# Patient Record
Sex: FEMALE | Race: BLACK OR AFRICAN AMERICAN | NOT HISPANIC OR LATINO | URBAN - METROPOLITAN AREA
[De-identification: names, ages, dates, MRNs, and addresses within clinical notes are randomized per-mention and may not be internally consistent; named-entity substitution may affect disease eponyms.]

---

## 2017-05-24 ENCOUNTER — EMERGENCY (EMERGENCY)
Facility: HOSPITAL | Age: 32
LOS: 1 days | Discharge: LEFT BEFORE DISCHARGE | End: 2017-05-24

## 2017-05-24 VITALS — DIASTOLIC BLOOD PRESSURE: 90 MMHG | HEART RATE: 80 BPM | TEMPERATURE: 98 F | SYSTOLIC BLOOD PRESSURE: 140 MMHG

## 2017-05-24 VITALS — HEIGHT: 67 IN | WEIGHT: 123.02 LBS

## 2017-05-24 DIAGNOSIS — R21 RASH AND OTHER NONSPECIFIC SKIN ERUPTION: ICD-10-CM

## 2017-05-24 DIAGNOSIS — Z98.89 OTHER SPECIFIED POSTPROCEDURAL STATES: Chronic | ICD-10-CM

## 2017-05-25 ENCOUNTER — EMERGENCY (EMERGENCY)
Facility: HOSPITAL | Age: 32
LOS: 1 days | Discharge: LEFT BEFORE DISCHARGE | End: 2017-05-25

## 2017-05-25 VITALS
SYSTOLIC BLOOD PRESSURE: 140 MMHG | HEART RATE: 62 BPM | TEMPERATURE: 98 F | WEIGHT: 123.02 LBS | RESPIRATION RATE: 20 BRPM | DIASTOLIC BLOOD PRESSURE: 73 MMHG | HEIGHT: 67 IN | OXYGEN SATURATION: 99 %

## 2017-05-25 DIAGNOSIS — R21 RASH AND OTHER NONSPECIFIC SKIN ERUPTION: ICD-10-CM

## 2017-05-25 DIAGNOSIS — Z98.89 OTHER SPECIFIED POSTPROCEDURAL STATES: Chronic | ICD-10-CM

## 2017-05-26 ENCOUNTER — EMERGENCY (EMERGENCY)
Facility: HOSPITAL | Age: 32
LOS: 1 days | Discharge: HOME | End: 2017-05-26
Attending: EMERGENCY MEDICINE | Admitting: EMERGENCY MEDICINE
Payer: COMMERCIAL

## 2017-05-26 VITALS
OXYGEN SATURATION: 99 % | HEIGHT: 67 IN | HEART RATE: 68 BPM | RESPIRATION RATE: 18 BRPM | WEIGHT: 123.02 LBS | TEMPERATURE: 98 F | SYSTOLIC BLOOD PRESSURE: 120 MMHG | DIASTOLIC BLOOD PRESSURE: 68 MMHG

## 2017-05-26 DIAGNOSIS — Z98.89 OTHER SPECIFIED POSTPROCEDURAL STATES: Chronic | ICD-10-CM

## 2017-05-26 PROCEDURE — 99283 EMERGENCY DEPT VISIT LOW MDM: CPT

## 2017-05-26 RX ORDER — IBUPROFEN 200 MG
1 TABLET ORAL
Qty: 15 | Refills: 0 | OUTPATIENT
Start: 2017-05-26 | End: 2017-05-31

## 2017-05-26 RX ORDER — VALACYCLOVIR 500 MG/1
1 TABLET, FILM COATED ORAL
Qty: 21 | Refills: 0 | OUTPATIENT
Start: 2017-05-26 | End: 2017-06-02

## 2017-05-26 NOTE — ED STATDOCS - DETAILS:
I, Susanna Conroy, personally performed the services described in the documentation, reviewed the documentation recorded by the scribe in my presence and it accurately and completely records my words and action.

## 2017-05-26 NOTE — ED STATDOCS - OBJECTIVE STATEMENT
33 y/o F pt w/ no significant PMHx presents to the ED c/o painful, burning rash to L forearm x5 days. Pt states that her rash began as a small blister, and pt scratched her blister, which made sx worse. Pt denies genital rash, fever, chills, chest pain, vomiting, SOB, throat pain, or any other complaints. NKDA. Pt is currently on Flagyl for a bacterial infection.

## 2017-05-26 NOTE — ED STATDOCS - NS ED MD SCRIBE ATTENDING SCRIBE SECTIONS
PHYSICAL EXAM/DISPOSITION/VITAL SIGNS( Pullset)/PAST MEDICAL/SURGICAL/SOCIAL HISTORY/INTAKE ASSESSMENT/SCREENINGS/HIV/HISTORY OF PRESENT ILLNESS/REVIEW OF SYSTEMS

## 2017-05-26 NOTE — ED STATDOCS - MEDICAL DECISION MAKING DETAILS
Will D/C w/ Valtrex and Motrin, and PMD f/u. Pt advised to keep away from unvaccinated for spread of chicken pox.

## 2017-05-26 NOTE — ED STATDOCS - SKIN, ML[facility]
Numerous tiny blisters from just above antecubital fossa down to area just above anterior aspect of L wrist.

## 2017-06-28 ENCOUNTER — EMERGENCY (EMERGENCY)
Facility: HOSPITAL | Age: 32
LOS: 1 days | Discharge: LEFT BEFORE DISCHARGE | End: 2017-06-28
Attending: EMERGENCY MEDICINE
Payer: COMMERCIAL

## 2017-06-28 VITALS — HEIGHT: 67 IN | WEIGHT: 125 LBS

## 2017-06-28 VITALS
OXYGEN SATURATION: 99 % | SYSTOLIC BLOOD PRESSURE: 112 MMHG | TEMPERATURE: 99 F | DIASTOLIC BLOOD PRESSURE: 73 MMHG | HEART RATE: 71 BPM | RESPIRATION RATE: 20 BRPM

## 2017-06-28 DIAGNOSIS — Z98.89 OTHER SPECIFIED POSTPROCEDURAL STATES: Chronic | ICD-10-CM

## 2017-06-28 PROCEDURE — 99282 EMERGENCY DEPT VISIT SF MDM: CPT

## 2017-06-28 NOTE — ED STATDOCS - OBJECTIVE STATEMENT
CC: 33 y/o pregnant F pt with PMHx of miscarriages presents to ED c/o vaginal bleeding x1 day.  Presenting symptoms: Pt reports she found out she is pregnant today and began to bleed. She notes she is having blood clots, which she had with her son as well. She states that her sx have mostly resolved.  Pertinent Positives: Vaginal bleeding  Pertinent Negatives: No abd pain, no fever, no nausea, no vomiting, no CP, no SOB, no dizziness  Timing: sudden onset, resolved  Quality: blood clots  Radiation: none  Severity: mild  Aggravating Factors: none  Relieving Factors: none

## 2017-06-28 NOTE — ED STATDOCS - MEDICAL DECISION MAKING DETAILS
Pt states the bleeding stopped. She has PMHx of bleeding with pregnancies. She has no pain ans is refusing tests at this time. Pt understands she may return at any time, verbalized understanding, and chooses to elope.

## 2017-06-28 NOTE — ED STATDOCS - NS ED ROS FT
Vaginal bleed  no fever  no chest pain  no SOB  no abd pain  no HA  All other ROS negative except as per HPI

## 2017-10-28 ENCOUNTER — EMERGENCY (EMERGENCY)
Facility: HOSPITAL | Age: 32
LOS: 1 days | Discharge: HOME | End: 2017-10-28
Attending: EMERGENCY MEDICINE
Payer: COMMERCIAL

## 2017-10-28 VITALS
DIASTOLIC BLOOD PRESSURE: 72 MMHG | RESPIRATION RATE: 18 BRPM | TEMPERATURE: 99 F | OXYGEN SATURATION: 99 % | SYSTOLIC BLOOD PRESSURE: 111 MMHG | HEIGHT: 67 IN | HEART RATE: 72 BPM | WEIGHT: 126.99 LBS

## 2017-10-28 DIAGNOSIS — Z98.89 OTHER SPECIFIED POSTPROCEDURAL STATES: Chronic | ICD-10-CM

## 2017-10-28 PROCEDURE — 99283 EMERGENCY DEPT VISIT LOW MDM: CPT

## 2017-10-28 PROCEDURE — 99282 EMERGENCY DEPT VISIT SF MDM: CPT

## 2017-10-28 NOTE — ED STATDOCS - MEDICAL DECISION MAKING DETAILS
pt with mild vaginal bleeding for the past 6 months. has been followed by GYN at the clinic and [facility] ER visits with recent US and lab work which was both nml. pt was Tx for cervicitis and BV without improvement. Will call GYN to get close follow up and likely hysteroscopy.

## 2017-10-28 NOTE — ED ADULT TRIAGE NOTE - CHIEF COMPLAINT QUOTE
Patient arrived to ED today with c/o abdominal pain and vaginal bleeding for the past couple of weeks.

## 2017-10-28 NOTE — ED STATDOCS - OBJECTIVE STATEMENT
CC: vaginal bleeding  Presenting symptoms: 31 y/o F with recent D&C presents to ED c/o constant vaginal bleeding and suprapubic pain x 3 weeks. Pt reports the blood is burgundy in color. She states that it "smells like a placenta" but she cannot be pregnant because she cannot have intercourse. pt went to [facility] previous to this and had an US done which was -. Pt has seen an OB/GYN and was told she has a bacterial infection and has been using a cream with no relief. She has an upcoming appointment. Pt had an  in July. She had been bleeding since the D&C. She has been getting her nml menstrual cycle but she has still been having this bleeding with associated pain.   Pertinent Positives: +vaginal bleeding, suprapubic pain  Pertinent Negatives: Denies fever, chills, N/V/D, HA, weakness, numbness, hematuria, dysuria  Timing: 3 weeks  Radiation: suprapubic region  Severity: mild  Aggravating Factors: none  Relieving Factors: none

## 2017-10-28 NOTE — ED STATDOCS - PHYSICAL EXAMINATION
Constitutional: Alert, NAD.   ENT: Airway patent. Nose clear. Mouth with normal mucosa.   Head: Atraumatic.   Eyes: Clear bilaterally. PERRL.   Cardiac: Normal rate.   Respiratory: Breath sounds clear bilaterally.   GI: Abdomen soft, non-tender, no guarding.   Musculoskeletal: FROM, no muscle or joint tenderness or swelling.   Neuro: alert and oriented, no focal deficits, no motor or sensory deficits.   Skin: Dry, intact, no rash.   Psych: normal mood and affect.

## 2017-10-28 NOTE — ED STATDOCS - NS ED ROS FT
+vaginal bleeding, suprapubic pain  no fever  no chest pain  no SOB  no N/V/D  no HA  All other ROS negative except as per HPI

## 2017-10-28 NOTE — ED STATDOCS - DIAGNOSIS COUNSELING, MDM
conducted a detailed discussion... Patient with irregular vaginal bleeding and has had extensive workup.  D/w GYN and will set patient up for outpatient hysteroscopy.  Patient given detailed discharge and return instructions and verbalized understanding.  Patient will follow up without fail.  All questions answered.

## 2018-11-13 ENCOUNTER — EMERGENCY (EMERGENCY)
Facility: HOSPITAL | Age: 33
LOS: 1 days | Discharge: LEFT BEFORE DISCHARGE | End: 2018-11-13
Attending: EMERGENCY MEDICINE
Payer: COMMERCIAL

## 2018-11-13 VITALS
DIASTOLIC BLOOD PRESSURE: 87 MMHG | SYSTOLIC BLOOD PRESSURE: 122 MMHG | OXYGEN SATURATION: 100 % | HEART RATE: 68 BPM | RESPIRATION RATE: 18 BRPM | TEMPERATURE: 98 F

## 2018-11-13 VITALS
OXYGEN SATURATION: 99 % | SYSTOLIC BLOOD PRESSURE: 138 MMHG | TEMPERATURE: 98 F | HEIGHT: 67 IN | RESPIRATION RATE: 20 BRPM | WEIGHT: 126.99 LBS | HEART RATE: 70 BPM | DIASTOLIC BLOOD PRESSURE: 96 MMHG

## 2018-11-13 DIAGNOSIS — Z98.89 OTHER SPECIFIED POSTPROCEDURAL STATES: Chronic | ICD-10-CM

## 2018-11-13 LAB
APPEARANCE UR: CLEAR — SIGNIFICANT CHANGE UP
BACTERIA # UR AUTO: ABNORMAL
BILIRUB UR-MCNC: NEGATIVE — SIGNIFICANT CHANGE UP
COLOR SPEC: YELLOW — SIGNIFICANT CHANGE UP
DIFF PNL FLD: ABNORMAL
EPI CELLS # UR: ABNORMAL
GLUCOSE UR QL: NEGATIVE MG/DL — SIGNIFICANT CHANGE UP
HCG UR QL: NEGATIVE — SIGNIFICANT CHANGE UP
KETONES UR-MCNC: NEGATIVE — SIGNIFICANT CHANGE UP
LEUKOCYTE ESTERASE UR-ACNC: ABNORMAL
NITRITE UR-MCNC: NEGATIVE — SIGNIFICANT CHANGE UP
PH UR: 7 — SIGNIFICANT CHANGE UP (ref 5–8)
PROT UR-MCNC: NEGATIVE MG/DL — SIGNIFICANT CHANGE UP
RBC CASTS # UR COMP ASSIST: SIGNIFICANT CHANGE UP /HPF (ref 0–4)
SP GR SPEC: 1.01 — SIGNIFICANT CHANGE UP (ref 1.01–1.02)
UROBILINOGEN FLD QL: NEGATIVE MG/DL — SIGNIFICANT CHANGE UP
WBC UR QL: SIGNIFICANT CHANGE UP /HPF (ref 0–5)

## 2018-11-13 PROCEDURE — 76830 TRANSVAGINAL US NON-OB: CPT

## 2018-11-13 PROCEDURE — 76830 TRANSVAGINAL US NON-OB: CPT | Mod: 26

## 2018-11-13 PROCEDURE — 76856 US EXAM PELVIC COMPLETE: CPT | Mod: 26

## 2018-11-13 PROCEDURE — 81025 URINE PREGNANCY TEST: CPT

## 2018-11-13 PROCEDURE — 87591 N.GONORRHOEAE DNA AMP PROB: CPT

## 2018-11-13 PROCEDURE — 99284 EMERGENCY DEPT VISIT MOD MDM: CPT | Mod: 25

## 2018-11-13 PROCEDURE — 87070 CULTURE OTHR SPECIMN AEROBIC: CPT

## 2018-11-13 PROCEDURE — 87086 URINE CULTURE/COLONY COUNT: CPT

## 2018-11-13 PROCEDURE — 96372 THER/PROPH/DIAG INJ SC/IM: CPT

## 2018-11-13 PROCEDURE — 87491 CHLMYD TRACH DNA AMP PROBE: CPT

## 2018-11-13 PROCEDURE — 99284 EMERGENCY DEPT VISIT MOD MDM: CPT

## 2018-11-13 PROCEDURE — 81001 URINALYSIS AUTO W/SCOPE: CPT

## 2018-11-13 PROCEDURE — 76856 US EXAM PELVIC COMPLETE: CPT

## 2018-11-13 RX ORDER — CEFTRIAXONE 500 MG/1
250 INJECTION, POWDER, FOR SOLUTION INTRAMUSCULAR; INTRAVENOUS ONCE
Qty: 0 | Refills: 0 | Status: COMPLETED | OUTPATIENT
Start: 2018-11-13 | End: 2018-11-13

## 2018-11-13 RX ORDER — IBUPROFEN 200 MG
600 TABLET ORAL ONCE
Qty: 0 | Refills: 0 | Status: DISCONTINUED | OUTPATIENT
Start: 2018-11-13 | End: 2018-11-13

## 2018-11-13 RX ORDER — METRONIDAZOLE 500 MG
1 TABLET ORAL
Qty: 28 | Refills: 0 | OUTPATIENT
Start: 2018-11-13 | End: 2018-11-26

## 2018-11-13 RX ORDER — METRONIDAZOLE 500 MG
500 TABLET ORAL ONCE
Qty: 0 | Refills: 0 | Status: COMPLETED | OUTPATIENT
Start: 2018-11-13 | End: 2018-11-13

## 2018-11-13 RX ORDER — IBUPROFEN 200 MG
600 TABLET ORAL ONCE
Qty: 0 | Refills: 0 | Status: COMPLETED | OUTPATIENT
Start: 2018-11-13 | End: 2018-11-13

## 2018-11-13 RX ADMIN — Medication 500 MILLIGRAM(S): at 12:02

## 2018-11-13 RX ADMIN — Medication 100 MILLIGRAM(S): at 12:01

## 2018-11-13 RX ADMIN — CEFTRIAXONE 250 MILLIGRAM(S): 500 INJECTION, POWDER, FOR SOLUTION INTRAMUSCULAR; INTRAVENOUS at 12:02

## 2018-11-13 NOTE — ED ADULT NURSE REASSESSMENT NOTE - NS ED NURSE REASSESS COMMENT FT1
Patient wanted to speak to Charge  Nurse, AGUSTIN charge nurse at bedside discussing plan of care, patient is upset she has been waiting for an hour.  Patient has been here only for 1 1/2 hours, urine was sent, spoke with Sono and ultrasound is sending for patient now.

## 2018-11-13 NOTE — ED STATDOCS - PROGRESS NOTE DETAILS
Pt seen and evaluated. Pt with pelvic pain x 1 month with thick purulent d/c x 1 week. Pt was seen by [facility] had cultures takin and US done Friday but pending results and pain persists. Pt is sexually active- denies new partners. Denies fevers/chills. Denies N/V. Abd soft NT/ND/NG. Pelvic- thick yellow nonodorous d/c. No cervicitis or CMT. Some left sided adnexal tenderness. Cultures obtained. Will tx with Rocephine, Doxy, flagyl and US to r/o TOA Pt requested to be discharged. Pt states that she needs to get to her sons school play.     I had a lengthy discussion with patient, and the patient wishes to leave at this time. The patient understands that he/she is leaving against medical advice despite the risk of missing a potential serious diagnosis which may lead to injury, disability and/or death. I discussed with the patient which tests would need to be performed and what type of monitoring would be necessary for the patient as well. I was unable to convince the patient to stay for further work-up. The patient is alert and oriented and demonstrates competence in making medical decisions.

## 2018-11-13 NOTE — ED ADULT NURSE REASSESSMENT NOTE - NS ED NURSE REASSESS COMMENT FT1
pt returned to ED on her own, "I walked back because I need to go, I'm ready to leave" stretcher left at sono. pt to sign out AMA. Reanna HEART aware, to have pt sign out ama.

## 2018-11-13 NOTE — ED STATDOCS - ATTENDING CONTRIBUTION TO CARE
I, Lukas Jiménez, performed the initial face to face bedside interview with this patient regarding history of present illness, review of symptoms and relevant past medical, social and family history.  I completed an independent physical examination.  I was the initial provider who evaluated this patient. I have signed out the follow up of any pending tests (i.e. labs, radiological studies) to the ACP.  I have communicated the patient’s plan of care and disposition with the ACP.  The history, relevant review of systems, past medical and surgical history, medical decision making, and physical examination was documented by the scribe in my presence and I attest to the accuracy of the documentation.

## 2018-11-13 NOTE — ED STATDOCS - OBJECTIVE STATEMENT
34 y/o F pt presents to ED c/o constant abdominal pain for 1 month. Pt has seen by GYN; pap smear completed. No imaging performed. Admits to having slight vaginal discharge. No OTC medication taken. Smoker; no EtOH use. Denies weight loss, nausea, vomiting, diarrhea, fever, vaginal bleeding, urinary symptoms. No further complaints at this time.

## 2018-11-13 NOTE — ED ADULT NURSE NOTE - OBJECTIVE STATEMENT
pt presents with c/o abd pain and discomfort x 1 months. denies fever and chills, + vaginal discharge thick yellow, + hx of gonorrhea in the past, " Its not an std , Its feels differently than before. ". + unprotected sex, denies multiple partners.

## 2018-11-14 LAB
C TRACH RRNA SPEC QL NAA+PROBE: SIGNIFICANT CHANGE UP
C TRACH RRNA SPEC QL NAA+PROBE: SIGNIFICANT CHANGE UP
N GONORRHOEA RRNA SPEC QL NAA+PROBE: SIGNIFICANT CHANGE UP
N GONORRHOEA RRNA SPEC QL NAA+PROBE: SIGNIFICANT CHANGE UP
SPECIMEN SOURCE: SIGNIFICANT CHANGE UP
SPECIMEN SOURCE: SIGNIFICANT CHANGE UP

## 2018-11-15 LAB
CULTURE RESULTS: SIGNIFICANT CHANGE UP
CULTURE RESULTS: SIGNIFICANT CHANGE UP
SPECIMEN SOURCE: SIGNIFICANT CHANGE UP
SPECIMEN SOURCE: SIGNIFICANT CHANGE UP

## 2018-11-15 NOTE — ED POST DISCHARGE NOTE - DETAILS
UC contaminated, as per chart, patient denies any urinary symptoms UC contaminated, as per chart, patient denies any urinary symptoms, +BV on genital culture, being treated with Flagyl

## 2018-12-17 ENCOUNTER — EMERGENCY (EMERGENCY)
Facility: HOSPITAL | Age: 33
LOS: 1 days | Discharge: LEFT BEFORE DISCHARGE | End: 2018-12-17
Attending: EMERGENCY MEDICINE
Payer: COMMERCIAL

## 2018-12-17 VITALS — HEIGHT: 68 IN | WEIGHT: 128.09 LBS

## 2018-12-17 VITALS
DIASTOLIC BLOOD PRESSURE: 74 MMHG | HEART RATE: 84 BPM | TEMPERATURE: 98 F | RESPIRATION RATE: 18 BRPM | OXYGEN SATURATION: 100 % | SYSTOLIC BLOOD PRESSURE: 137 MMHG

## 2018-12-17 DIAGNOSIS — Z98.89 OTHER SPECIFIED POSTPROCEDURAL STATES: Chronic | ICD-10-CM

## 2018-12-17 PROCEDURE — 99282 EMERGENCY DEPT VISIT SF MDM: CPT

## 2018-12-17 RX ORDER — SENNOSIDES/DOCUSATE SODIUM 8.6MG-50MG
2 TABLET ORAL
Qty: 28 | Refills: 0 | OUTPATIENT
Start: 2018-12-17 | End: 2018-12-30

## 2018-12-17 NOTE — ED STATDOCS - PHYSICAL EXAMINATION
Rectal: + <0.5 cm fissure in 3:00 position of rectum, no active bleeding. Flat, nontender, non-discolored hemorrhoid at 12:00 position, no active bleeding, no induration or erythema  Pt refused further examination

## 2018-12-17 NOTE — ED STATDOCS - OBJECTIVE STATEMENT
34 y/o F pt presents to ED c/o hemorrhoid and rectal pain x 1 week. Pt states the pain is worse with having a BM. She noted 1 days of rectal bleeding last week. Pt applied Bacitracin and Neosporin with no relief. Pt denies dysuria, bowel incontinence, diarrhea. constipation  Pt is yelling and verbally abusive.

## 2018-12-17 NOTE — ED STATDOCS - MEDICAL DECISION MAKING DETAILS
Pt with anal fissure, refused further examination. yelling and agitate towards myself and staff, refused full physical exam. Stool softener sent to pharmacy I advised pt to apply vaseline, sitz bath, blotting rectal area and plenty of hydration and to f/u PMD. Pt refuses to sign D/C paperwork and left

## 2020-09-08 ENCOUNTER — EMERGENCY (EMERGENCY)
Facility: HOSPITAL | Age: 35
LOS: 1 days | Discharge: HOME | End: 2020-09-08
Attending: EMERGENCY MEDICINE
Payer: COMMERCIAL

## 2020-09-08 VITALS
TEMPERATURE: 98 F | HEIGHT: 67 IN | WEIGHT: 121.03 LBS | HEART RATE: 58 BPM | DIASTOLIC BLOOD PRESSURE: 90 MMHG | SYSTOLIC BLOOD PRESSURE: 136 MMHG | RESPIRATION RATE: 20 BRPM | OXYGEN SATURATION: 100 %

## 2020-09-08 DIAGNOSIS — Z98.89 OTHER SPECIFIED POSTPROCEDURAL STATES: Chronic | ICD-10-CM

## 2020-09-08 PROCEDURE — 99284 EMERGENCY DEPT VISIT MOD MDM: CPT

## 2020-09-08 PROCEDURE — 99283 EMERGENCY DEPT VISIT LOW MDM: CPT

## 2020-09-08 RX ORDER — SODIUM CHLORIDE 9 MG/ML
1000 INJECTION INTRAMUSCULAR; INTRAVENOUS; SUBCUTANEOUS ONCE
Refills: 0 | Status: DISCONTINUED | OUTPATIENT
Start: 2020-09-08 | End: 2020-09-14

## 2020-09-08 NOTE — ED STATDOCS - ATTENDING CONTRIBUTION TO CARE
I, Bebeto Case, performed the initial face to face bedside interview with this patient regarding history of present illness, review of symptoms and relevant past medical, social and family history.  I completed an independent physical examination.  I was the initial provider who evaluated this patient. I have signed out the follow up of any pending tests (i.e. labs, radiological studies) to the resident  I have communicated the patient’s plan of care and disposition with the resident. The history, relevant review of systems, past medical and surgical history, medical decision making, and physical examination was documented by the scribe in my presence and I attest to the accuracy of the documentation.

## 2020-09-08 NOTE — ED STATDOCS - OBJECTIVE STATEMENT
36 y/o female with PMHx of Patient reports left sided abdominal pain, and vaginal discharge. States the abdominal pain is constant, possible pregnancy, A2      Denies nausea, vomiting, urinary symptoms, use of birth control, fever, cough, SOB  LNMP: 2 weeks ago

## 2020-09-08 NOTE — ED STATDOCS - PATIENT PORTAL LINK FT
You can access the FollowMyHealth Patient Portal offered by [facility] by registering at the following website: [email or web address]. By joining [employer]’s FollowMyHealth portal, you will also be able to view your health information using other applications (apps) compatible with our system.

## 2020-09-08 NOTE — ED STATDOCS - CLINICAL SUMMARY MEDICAL DECISION MAKING FREE TEXT BOX
34 y/o female with few days of LLQ abdominal pain, and vaginal discharge, unknown if pregnant, possible ovarian cyst vs ectopic vs diverticulitis. Check labs, US, and re-assess.

## 2020-09-08 NOTE — ED STATDOCS - PROGRESS NOTE DETAILS
Dr. Ennis: Saw and evaluated patient. Agree with Dr. Case's assessment and plan. Patient states that she needs to go  her son from school and can not stay for workup. I explained risks and benefits of leaving AMA to patient. Patient understood and signed out AMA.

## 2021-01-12 ENCOUNTER — EMERGENCY (EMERGENCY)
Facility: HOSPITAL | Age: 36
LOS: 1 days | Discharge: HOME | End: 2021-01-12
Attending: EMERGENCY MEDICINE
Payer: COMMERCIAL

## 2021-01-12 VITALS
OXYGEN SATURATION: 100 % | RESPIRATION RATE: 22 BRPM | WEIGHT: 125 LBS | TEMPERATURE: 98 F | HEIGHT: 67 IN | HEART RATE: 103 BPM | SYSTOLIC BLOOD PRESSURE: 157 MMHG | DIASTOLIC BLOOD PRESSURE: 98 MMHG

## 2021-01-12 DIAGNOSIS — Z98.89 OTHER SPECIFIED POSTPROCEDURAL STATES: Chronic | ICD-10-CM

## 2021-01-12 PROCEDURE — 12001 RPR S/N/AX/GEN/TRNK 2.5CM/<: CPT | Mod: F9

## 2021-01-12 PROCEDURE — 99282 EMERGENCY DEPT VISIT SF MDM: CPT | Mod: 25

## 2021-01-12 PROCEDURE — 99283 EMERGENCY DEPT VISIT LOW MDM: CPT | Mod: 25

## 2021-01-12 PROCEDURE — 12001 RPR S/N/AX/GEN/TRNK 2.5CM/<: CPT

## 2021-01-12 NOTE — ED PROVIDER NOTE - PHYSICAL EXAMINATION
General: A&Ox3. In NAD, non-toxic appearing; well nourished/developed.  Head: Normocephalic/atraumatic.  Cardio: No lifts, heaves, visible pulsations. No thrills. Rate and rhythm regular, S1 & S2 clear. No audible murmur, gallop, or rub.   PV: Pulses: b/l 2+ radial. Capillary refill <2 seconds.  Resp: Normal AP to lateral diameter, symmetrical excursion b/l. Breath sounds vesicular, symmetrical and without rales, rhonchi or wheezing b/l.  Skin/MSK/Neuro: Warm, dry, color normal for race. No deformity. Approx. 0.5cm superficial ?laceration to right 5th digit. No tendon involvement. NVIT.
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
## 2021-01-12 NOTE — ED PROVIDER NOTE - NSFOLLOWUPINSTRUCTIONS_ED_ALL_ED_FT
- Ibuprofen/acetaminophen for pain.  - Allow for glue to fall off. Do not apply Bacitracin or it will dissolve the glue.   - Keep dressing clean, dry and in place for 24 hours. Then, may remove and cleanse using soap and water.  - Please follow up with your primary care physician in 24-48 hours.  - Please seek immediate medical attention for any new/worsening, signs/symptoms or concerns including but not limited to severe pain/swelling/surrounding redness, or drainage from wound.    Feel better!      provider.
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
## 2021-01-12 NOTE — ED PROVIDER NOTE - PATIENT PORTAL LINK FT
You can access the FollowMyHealth Patient Portal offered by [facility] by registering at the following website: [email or web address]. By joining [employer]’s FollowMyHealth portal, you will also be able to view your health information using other applications (apps) compatible with our system.

## 2021-09-29 NOTE — ED ADULT TRIAGE NOTE - PAIN RATING/NUMBER SCALE (0-10): ACTIVITY
Patient responded to [facility] message:   I will let you know when I can go to have labs. Too hard for me go get to appointments, so I want it the same day as another appointment. Not able to just go for lab only.     8

## 2021-10-04 NOTE — ED STATDOCS - NSCAREINITIATED _GEN_ER
Gonzalez Marks(Attending) Complex Repair And Transposition Flap Text: The defect edges were debeveled with a #15 scalpel blade.  The primary defect was closed partially with a complex linear closure.  Given the location of the remaining defect, shape of the defect and the proximity to free margins a transposition flap was deemed most appropriate for complete closure of the defect.  Using a sterile surgical marker, an appropriate advancement flap was drawn incorporating the defect and placing the expected incisions within the relaxed skin tension lines where possible.    The area thus outlined was incised deep to adipose tissue with a #15 scalpel blade.  The skin margins were undermined to an appropriate distance in all directions utilizing iris scissors.

## 2023-03-09 NOTE — ED ADULT TRIAGE NOTE - SOURCE OF INFORMATION
Patient Preparation Of Recipient Site - Graft: The eschar was removed surgically with sharp dissection to facilitate appropriate survival of the following graft.

## 2024-08-27 NOTE — ED ADULT TRIAGE NOTE - NS ED NURSE DIRECT TO ROOM YN
Yes Detail Level: Zone Sunscreen Recommendations: Neutrogena Detail Level: Generalized Detail Level: Detailed
